# Patient Record
Sex: FEMALE | Race: WHITE | NOT HISPANIC OR LATINO | Employment: FULL TIME | URBAN - NONMETROPOLITAN AREA
[De-identification: names, ages, dates, MRNs, and addresses within clinical notes are randomized per-mention and may not be internally consistent; named-entity substitution may affect disease eponyms.]

---

## 2023-12-12 ENCOUNTER — OFFICE VISIT (OUTPATIENT)
Dept: URGENT CARE | Facility: CLINIC | Age: 33
End: 2023-12-12
Payer: COMMERCIAL

## 2023-12-12 VITALS
RESPIRATION RATE: 16 BRPM | HEIGHT: 65 IN | SYSTOLIC BLOOD PRESSURE: 122 MMHG | BODY MASS INDEX: 35.82 KG/M2 | DIASTOLIC BLOOD PRESSURE: 76 MMHG | TEMPERATURE: 98.5 F | OXYGEN SATURATION: 99 % | HEART RATE: 72 BPM | WEIGHT: 215 LBS

## 2023-12-12 DIAGNOSIS — J02.0 STREP PHARYNGITIS: Primary | ICD-10-CM

## 2023-12-12 LAB — S PYO AG THROAT QL: POSITIVE

## 2023-12-12 PROCEDURE — 99213 OFFICE O/P EST LOW 20 MIN: CPT

## 2023-12-12 PROCEDURE — 87880 STREP A ASSAY W/OPTIC: CPT

## 2023-12-12 RX ORDER — BUPROPION HYDROCHLORIDE 150 MG/1
150 TABLET ORAL DAILY
COMMUNITY

## 2023-12-12 RX ORDER — AMOXICILLIN AND CLAVULANATE POTASSIUM 875; 125 MG/1; MG/1
1 TABLET, FILM COATED ORAL EVERY 12 HOURS SCHEDULED
Qty: 14 TABLET | Refills: 0 | Status: SHIPPED | OUTPATIENT
Start: 2023-12-12 | End: 2023-12-19

## 2023-12-12 NOTE — LETTER
December 12, 2023     Patient: Luan Brandt   YOB: 1990   Date of Visit: 12/12/2023       To Whom it May Concern:    Luan Brandt was seen in my clinic on 12/12/2023. She may return to work on 12/14 . If you have any questions or concerns, please don't hesitate to call.          Sincerely,          Rosalino Ruiz PA-C        CC: No Recipients

## 2023-12-12 NOTE — PROGRESS NOTES
[facility]        NAME: April Justice is a 34 y.o. female  : 1990    MRN: [id number]  DATE: 2023  TIME: 3:09 PM    Assessment and Plan   Strep pharyngitis [J02.0]  1. Strep pharyngitis  POCT rapid strepA    amoxicillin-clavulanate (AUGMENTIN) 875-125 mg per tablet        Discussed problem with patient. Strep performed today was positive. However main complaints are left-sided does have increased left-sided tonsillar swelling compared to right. Discussed concern for tonsillar abscess. Will place on Augmentin for this issue but discussed peritonsillar abscesses if not improving after course of Augmentin she reports the ER for follow-up. Tylenol ibuprofen for pain, warm salt water gargles. Patient Instructions       Follow up with PCP in 3-5 days. Proceed to  ER if symptoms worsen. Chief Complaint     Chief Complaint   Patient presents with   • Earache     C/o left earache and sore throat. Family members recently dx strep         History of Present Illness       C/o left earache and sore throat. Family members recently dx strep. Denies any trouble swallowing or difficulty talking. Denies any fevers or chills. Not using anything over-the-counter for symptoms. Earache   Associated symptoms include a sore throat. Pertinent negatives include no abdominal pain, coughing, diarrhea, headaches, rhinorrhea or vomiting. Review of Systems   Review of Systems   Constitutional:  Negative for appetite change, chills, fatigue and fever. HENT:  Positive for ear pain and sore throat. Negative for congestion, postnasal drip and rhinorrhea. Respiratory:  Negative for cough, shortness of breath, wheezing and stridor. Cardiovascular:  Negative for chest pain and palpitations. Gastrointestinal:  Negative for abdominal pain, constipation, diarrhea, nausea and vomiting. Musculoskeletal:  Negative for myalgias.    Neurological:  Negative for dizziness, syncope, light-headedness and headaches. Current Medications       Current Outpatient Medications:   •  amoxicillin-clavulanate (AUGMENTIN) 875-125 mg per tablet, Take 1 tablet by mouth every 12 (twelve) hours for 7 days, Disp: 14 tablet, Rfl: 0  •  buPROPion (WELLBUTRIN XL) 150 mg 24 hr tablet, Take 150 mg by mouth daily, Disp: , Rfl:   •  Levonorgestrel (MIRENA) 20 MCG/DAY IUD, 1 each by Intrauterine route, Disp: , Rfl:     Current Allergies     Allergies as of 12/12/2023 - Reviewed 12/12/2023   Allergen Reaction Noted   • Lamotrigine Other (See Comments) 01/23/2019            The following portions of the patient's history were reviewed and updated as appropriate: allergies, current medications, past family history, past medical history, past social history, past surgical history and problem list.     Past Medical History:   Diagnosis Date   • Depression        Past Surgical History:   Procedure Laterality Date   • NO PAST SURGERIES         Family History   Problem Relation Age of Onset   • No Known Problems Mother    • No Known Problems Father          Medications have been verified. Objective   /76   Pulse 72   Temp 98.5 °F (36.9 °C)   Resp 16   Ht 5' 5" (1.651 m)   Wt 97.5 kg (215 lb)   SpO2 99%   BMI 35.78 kg/m²        Physical Exam     Physical Exam  Vitals and nursing note reviewed. Constitutional:       General: She is not in acute distress. Appearance: Normal appearance. She is normal weight. She is not ill-appearing, toxic-appearing or diaphoretic. HENT:      Head: Normocephalic. Right Ear: Tympanic membrane, ear canal and external ear normal. There is no impacted cerumen. Left Ear: Tympanic membrane, ear canal and external ear normal. There is no impacted cerumen. Nose: Nose normal. No congestion or rhinorrhea. Mouth/Throat:      Lips: Pink. No lesions. Mouth: Mucous membranes are moist. No injury, lacerations, oral lesions or angioedema. Tongue: No lesions. Tongue does not deviate from midline. Palate: No mass and lesions. Pharynx: Oropharynx is clear. Posterior oropharyngeal erythema present. No pharyngeal swelling, oropharyngeal exudate or uvula swelling. Tonsils: 2+ on the right. 3+ on the left. Eyes:      General:         Right eye: No discharge. Left eye: No discharge. Extraocular Movements: Extraocular movements intact. Conjunctiva/sclera: Conjunctivae normal.      Pupils: Pupils are equal, round, and reactive to light. Neck:      Vascular: No carotid bruit. Cardiovascular:      Rate and Rhythm: Normal rate and regular rhythm. Pulses: Normal pulses. Heart sounds: Normal heart sounds. No murmur heard. No friction rub. No gallop. Pulmonary:      Effort: Pulmonary effort is normal. No respiratory distress. Breath sounds: Normal breath sounds. No stridor. No wheezing, rhonchi or rales. Chest:      Chest wall: No tenderness. Musculoskeletal:      Cervical back: Normal range of motion and neck supple. No rigidity or tenderness. Lymphadenopathy:      Cervical: Cervical adenopathy present. Neurological:      Mental Status: She is alert.

## 2024-02-10 LAB
ESTRADIOL SERPL-MCNC: 43 PG/ML
FSH SERPL-ACNC: 5.1 MIU/ML
LH SERPL-ACNC: 3.6 MIU/ML

## 2024-02-29 LAB
ABO GROUP BLD: NORMAL
ALBUMIN SERPL-MCNC: 4.5 G/DL (ref 3.6–5.1)
ALBUMIN/GLOB SERPL: 1.4 (CALC) (ref 1–2.5)
ALP SERPL-CCNC: 59 U/L (ref 31–125)
ALT SERPL-CCNC: 17 U/L (ref 6–29)
APTT PPP: 30 SEC (ref 23–32)
AST SERPL-CCNC: 15 U/L (ref 10–30)
B-HCG SERPL-ACNC: <5 MIU/ML
BASOPHILS # BLD AUTO: 23 CELLS/UL (ref 0–200)
BASOPHILS NFR BLD AUTO: 0.3 %
BILIRUB SERPL-MCNC: 0.4 MG/DL (ref 0.2–1.2)
BLD GP AB SCN SERPL QL: NORMAL
BUN SERPL-MCNC: 11 MG/DL (ref 7–25)
BUN/CREAT SERPL: NORMAL (CALC) (ref 6–22)
CALCIUM SERPL-MCNC: 9.3 MG/DL (ref 8.6–10.2)
CHLORIDE SERPL-SCNC: 105 MMOL/L (ref 98–110)
CO2 SERPL-SCNC: 24 MMOL/L (ref 20–32)
CREAT SERPL-MCNC: 0.65 MG/DL (ref 0.5–0.97)
EOSINOPHIL # BLD AUTO: 53 CELLS/UL (ref 15–500)
EOSINOPHIL NFR BLD AUTO: 0.7 %
ERYTHROCYTE [DISTWIDTH] IN BLOOD BY AUTOMATED COUNT: 12.9 % (ref 11–15)
GFR/BSA.PRED SERPLBLD CYS-BASED-ARV: 121 ML/MIN/1.73M2
GLOBULIN SER CALC-MCNC: 3.2 G/DL (CALC) (ref 1.9–3.7)
GLUCOSE SERPL-MCNC: 89 MG/DL (ref 65–99)
HCT VFR BLD AUTO: 40.5 % (ref 35–45)
HGB BLD-MCNC: 13.4 G/DL (ref 11.7–15.5)
INR PPP: 1
LYMPHOCYTES # BLD AUTO: 2174 CELLS/UL (ref 850–3900)
LYMPHOCYTES NFR BLD AUTO: 28.6 %
MCH RBC QN AUTO: 28.7 PG (ref 27–33)
MCHC RBC AUTO-ENTMCNC: 33.1 G/DL (ref 32–36)
MCV RBC AUTO: 86.7 FL (ref 80–100)
MONOCYTES # BLD AUTO: 388 CELLS/UL (ref 200–950)
MONOCYTES NFR BLD AUTO: 5.1 %
NEUTROPHILS # BLD AUTO: 4963 CELLS/UL (ref 1500–7800)
NEUTROPHILS NFR BLD AUTO: 65.3 %
PLATELET # BLD AUTO: 210 THOUSAND/UL (ref 140–400)
PMV BLD REES-ECKER: 12.3 FL (ref 7.5–12.5)
POTASSIUM SERPL-SCNC: 4.4 MMOL/L (ref 3.5–5.3)
PROT SERPL-MCNC: 7.7 G/DL (ref 6.1–8.1)
PROTHROMBIN TIME: 10.9 SEC (ref 9–11.5)
RBC # BLD AUTO: 4.67 MILLION/UL (ref 3.8–5.1)
RH BLD: NORMAL
SODIUM SERPL-SCNC: 137 MMOL/L (ref 135–146)
WBC # BLD AUTO: 7.6 THOUSAND/UL (ref 3.8–10.8)

## 2024-03-25 ENCOUNTER — OFFICE VISIT (OUTPATIENT)
Dept: URGENT CARE | Facility: CLINIC | Age: 34
End: 2024-03-25
Payer: COMMERCIAL

## 2024-03-25 VITALS
HEIGHT: 65 IN | TEMPERATURE: 98.6 F | SYSTOLIC BLOOD PRESSURE: 112 MMHG | BODY MASS INDEX: 36.65 KG/M2 | WEIGHT: 220 LBS | HEART RATE: 100 BPM | OXYGEN SATURATION: 99 % | RESPIRATION RATE: 16 BRPM | DIASTOLIC BLOOD PRESSURE: 70 MMHG

## 2024-03-25 DIAGNOSIS — B34.9 VIRAL ILLNESS: Primary | ICD-10-CM

## 2024-03-25 DIAGNOSIS — J02.9 SORE THROAT: ICD-10-CM

## 2024-03-25 LAB — S PYO AG THROAT QL: NEGATIVE

## 2024-03-25 PROCEDURE — 87880 STREP A ASSAY W/OPTIC: CPT

## 2024-03-25 PROCEDURE — 99213 OFFICE O/P EST LOW 20 MIN: CPT

## 2024-03-25 RX ORDER — DIPHENOXYLATE HYDROCHLORIDE AND ATROPINE SULFATE 2.5; .025 MG/1; MG/1
1 TABLET ORAL DAILY
COMMUNITY

## 2024-03-25 NOTE — LETTER
March 25, 2024     Patient: Nara Bryson   YOB: 1990   Date of Visit: 3/25/2024       To Whom It May Concern:    It is my medical opinion that Nara Bryson may return to work on 3/26/2024 .    If you have any questions or concerns, please don't hesitate to call.         Sincerely,        Panda Savage PA-C    CC: No Recipients

## 2024-03-25 NOTE — PATIENT INSTRUCTIONS
Rapid strep test completed and negative. Infection appears viral. Recommend symptomatic treatment. Can take ibuprofen or tylenol as needed for pain or fever. Over the counter cough and cold medications to help with symptoms. Use salt water gargles for sore throat and throat lozenges. Cough drops as needed. Wash hands frequently to prevent the spread of infection. If not improving over the next 7-10 days, follow up with PCP. Symptoms may persist for 10-14 days.  To present to the ER if symptoms worsen.     If tests are performed, our office will contact you with results only if changes need to made to the care plan discussed with you at the visit. You can review your full results on [facility].

## 2024-03-25 NOTE — PROGRESS NOTES
[facility]        NAME: Nara Bryson is a 33 y.o. female  : 1990    MRN: [id number]  DATE: 2024  TIME: 9:33 AM    Assessment and Plan   Viral illness [B34.9]  1. Viral illness        2. Sore throat  POCT rapid ANTIGEN strepA        Rapid strep: neg    Patient Instructions     Rapid strep test completed and negative. Infection appears viral. Recommend symptomatic treatment. Can take ibuprofen or tylenol as needed for pain or fever. Over the counter cough and cold medications to help with symptoms. Use salt water gargles for sore throat and throat lozenges. Cough drops as needed. Wash hands frequently to prevent the spread of infection. If not improving over the next 7-10 days, follow up with PCP. Symptoms may persist for 10-14 days.  To present to the ER if symptoms worsen.     If tests are performed, our office will contact you with results only if changes need to made to the care plan discussed with you at the visit. [facility]    Chief Complaint     Chief Complaint   Patient presents with    Cold Like Symptoms     C/o head congestion, sore throat, cough and body aches. Onset x2 days ago.          History of Present Illness       URI   This is a new problem. Episode onset: 2 days. There has been no fever. Associated symptoms include congestion, coughing and a sore throat. Pertinent negatives include no chest pain, ear pain, rhinorrhea or wheezing.   Her son had strep 1 week ago.     Review of Systems   Review of Systems   Constitutional:  Negative for chills, diaphoresis, fatigue and fever.   HENT:  Positive for congestion, postnasal drip and sore throat. Negative for ear pain, rhinorrhea, sinus pressure and trouble swallowing.    Respiratory:  Positive for cough. Negative for chest tightness, shortness of breath and wheezing.    Cardiovascular:  Negative for chest pain and palpitations.   Musculoskeletal:  Positive for myalgias.   Skin:   "Negative for color change.   Neurological:  Negative for dizziness and light-headedness.   Psychiatric/Behavioral:  Negative for sleep disturbance.          Current Medications       Current Outpatient Medications:     multivitamin (THERAGRAN) TABS, Take 1 tablet by mouth daily, Disp: , Rfl:     buPROPion (WELLBUTRIN XL) 150 mg 24 hr tablet, Take 150 mg by mouth daily (Patient not taking: Reported on 3/25/2024), Disp: , Rfl:     Levonorgestrel (MIRENA) 20 MCG/DAY IUD, 1 each by Intrauterine route (Patient not taking: Reported on 3/25/2024), Disp: , Rfl:     Current Allergies     Allergies as of 03/25/2024 - Reviewed 03/25/2024   Allergen Reaction Noted    Lamotrigine Other (See Comments) 01/23/2019            The following portions of the patient's history were reviewed and updated as appropriate: allergies, current medications, past family history, past medical history, past social history, past surgical history and problem list.     Past Medical History:   Diagnosis Date    Depression        Past Surgical History:   Procedure Laterality Date    NO PAST SURGERIES      TUBAL LIGATION         Family History   Problem Relation Age of Onset    No Known Problems Mother     No Known Problems Father          Medications have been verified.        Objective   /70   Pulse 100   Temp 98.6 °F (37 °C)   Resp 16   Ht 5' 5\" (1.651 m)   Wt 99.8 kg (220 lb)   SpO2 99%   BMI 36.61 kg/m²        Physical Exam     Physical Exam  Constitutional:       General: She is not in acute distress.     Appearance: Normal appearance. She is not ill-appearing.   HENT:      Head: Normocephalic.      Right Ear: Tympanic membrane and external ear normal.      Left Ear: Tympanic membrane and external ear normal.      Nose: Congestion present.      Mouth/Throat:      Mouth: Mucous membranes are moist.      Pharynx: Oropharynx is clear. Posterior oropharyngeal erythema present. No oropharyngeal exudate.      Tonsils: 2+ on the right. 2+ on " the left.   Cardiovascular:      Rate and Rhythm: Normal rate and regular rhythm.      Pulses: Normal pulses.      Heart sounds: Normal heart sounds.   Pulmonary:      Effort: Pulmonary effort is normal. No respiratory distress.      Breath sounds: Normal breath sounds. No stridor. No wheezing, rhonchi or rales.   Lymphadenopathy:      Cervical: No cervical adenopathy.   Skin:     General: Skin is warm and dry.   Neurological:      General: No focal deficit present.      Mental Status: She is alert and oriented to person, place, and time. Mental status is at baseline.   Psychiatric:         Mood and Affect: Mood normal.         Behavior: Behavior normal.         Thought Content: Thought content normal.         Judgment: Judgment normal.

## 2024-06-10 LAB
CLINICAL INFO: NORMAL
CYTOLOGY CMNT CVX/VAG CYTO-IMP: NORMAL
DATE PREVIOUS BX: NORMAL
HPV I/H RISK 1 DNA CVX QL PROBE+SIG AMP: NOT DETECTED
LMP START DATE: NORMAL
SL AMB PREV. PAP:: NORMAL
SPECIMEN SOURCE CVX/VAG CYTO: NORMAL

## 2024-08-08 LAB
CLINICAL INFO: NORMAL
CYTO CVX: NORMAL
CYTOLOGY CMNT CVX/VAG CYTO-IMP: NORMAL
DATE PREVIOUS BX: NORMAL
HPV I/H RISK 1 DNA CVX QL PROBE+SIG AMP: NOT DETECTED
LMP START DATE: NORMAL
SL AMB PREV. PAP:: NORMAL
SPECIMEN SOURCE CVX/VAG CYTO: NORMAL